# Patient Record
Sex: MALE
[De-identification: names, ages, dates, MRNs, and addresses within clinical notes are randomized per-mention and may not be internally consistent; named-entity substitution may affect disease eponyms.]

---

## 2019-09-19 PROBLEM — Z00.00 ENCOUNTER FOR PREVENTIVE HEALTH EXAMINATION: Status: ACTIVE | Noted: 2019-09-19

## 2019-09-23 ENCOUNTER — APPOINTMENT (OUTPATIENT)
Dept: PEDIATRIC ORTHOPEDIC SURGERY | Facility: CLINIC | Age: 19
End: 2019-09-23
Payer: COMMERCIAL

## 2019-09-23 DIAGNOSIS — Z78.9 OTHER SPECIFIED HEALTH STATUS: ICD-10-CM

## 2019-09-23 DIAGNOSIS — M41.129 ADOLESCENT IDIOPATHIC SCOLIOSIS, SITE UNSPECIFIED: ICD-10-CM

## 2019-09-23 PROCEDURE — 72082 X-RAY EXAM ENTIRE SPI 2/3 VW: CPT

## 2019-09-23 PROCEDURE — 99215 OFFICE O/P EST HI 40 MIN: CPT | Mod: 25

## 2019-10-02 PROBLEM — M41.129 ADOLESCENT IDIOPATHIC SCOLIOSIS: Status: ACTIVE | Noted: 2019-10-02

## 2019-10-02 NOTE — REASON FOR VISIT
[Initial Evaluation] : an initial evaluation [Patient] : patient [Mother] : mother [FreeTextEntry1] : scoliosis evaluation

## 2019-10-02 NOTE — PHYSICAL EXAM
[Conjuntiva] : normal conjuntiva [Pupils] : pupils were equal and round [Lesions] : no lesions [Rash] : no rash [Ulcers] : no ulcers [Ears] : normal ears [Nose] : normal nose [Lips] : normal lips [Normal] : There is brisk capillary refill in the digits of the affected extremity. They are symmetric pulses in the bilateral upper and lower extremities [FreeTextEntry1] : Spine PE:\par Skin intact\par No gross deformity, Left shoulder/scapula asymmetric and higher than right\par Garcia forward bend shows left thoracic prominence measuring approx 5°\par No midline TTP C/T/L/S spine, No bony step offs\par No paraspinal muscle ttp/hypertonicity \par Negative clonus\par Negative babinski\par Negative castillo\par Able to stand on tippy toes\par Able to stand on heels\par Normal gait\par \par Motor: \par          ElbowFlex    WristExt   ElbowExt   FingerFlex   FingerAbd   \par R            5/5                5/5            5/5             5/5               5/5\par L             5/5               5/5             5/5             5/5               5/5\par \par \par       HipFlex   HipExt   KneeFlex   KneeExt   AnkleDorsi   AnklePlantar   HaluxDorsi   HaluxPlantar\par R        5/5 5/5            5/5            5/5             5/5                 5/5                    5/5                5/5\par L         5/5        5/5            5/5            5/5             5/5                 5/5                    5/5                5/5\par \par \par Sensory:\par           C5         C6         C7      C8       T1        (0=absent, 1=impaired, 2=normal, NT=not testable)\par R        2          2              2        2         2\par L         2          2              2        2         2\par \par            L2          L3         L4      L5       S1         (0=absent, 1=impaired, 2=normal, NT=not testable)\par R          2          2              2        2         2\par L          2          2              2        2         2\par \par

## 2019-10-02 NOTE — HISTORY OF PRESENT ILLNESS
[FreeTextEntry1] : The patient is a 19 year old male who presents today for evaluation of his back, sent by PMD for evaluation for scoliosis. Per patient his doctor and mother noticed a curve in his back 2 months ago and sent him here for evaluation. He denies any pain in his back, radiating pain, numbness, tingling, bladder or bowel incontinence,Weakness in his legs or trauma to his back. He reports he is active and plays sports and his back does not ever bother him.

## 2019-10-02 NOTE — DATA REVIEWED
[de-identified] : AP and lateral scoliosis films taken today show a mild left thoracic curve measuring 12-14° and a right thoracolumbar curve measuring approx 9° and thoracolumbar kyphosis

## 2019-10-02 NOTE — REVIEW OF SYSTEMS
[Change in Activity] : no change in activity [Fever Above 102] : no fever [Rash] : no rash [Itching] : no itching [Limping] : no limping [Joint Pains] : no arthralgias [Joint Swelling] : no joint swelling [Muscle Aches] : no muscle aches [Short Stature] : no short stature

## 2019-10-02 NOTE — ASSESSMENT
[FreeTextEntry1] : 19 year old male with spinal asymmetry <15° and Scheuermann's kyphosis\par The patient does not have pain or any symptoms associated with his spinal asymmetry and is Risser 5\par Given he has reached full skeletal growth there is very little risk of curve progression \par Discussed the natural history of scoliosis and spinal asymmetry with patient and mother, including when we observe, brace, or operate on scoliosis.\par Patient given prescription for physical therapy for core strengthening and postural training\par All questions answered\par Plan for follow-up on a PRN basis\par Patient and mother agree with above plan